# Patient Record
Sex: MALE | HISPANIC OR LATINO | Employment: FULL TIME | URBAN - METROPOLITAN AREA
[De-identification: names, ages, dates, MRNs, and addresses within clinical notes are randomized per-mention and may not be internally consistent; named-entity substitution may affect disease eponyms.]

---

## 2020-02-09 ENCOUNTER — HOSPITAL ENCOUNTER (INPATIENT)
Facility: HOSPITAL | Age: 40
LOS: 1 days | Discharge: HOME | End: 2020-02-11
Attending: EMERGENCY MEDICINE | Admitting: INTERNAL MEDICINE

## 2020-02-09 DIAGNOSIS — K52.9 ACUTE COLITIS: Primary | ICD-10-CM

## 2020-02-09 LAB
ALBUMIN SERPL-MCNC: 4.1 G/DL (ref 3.5–5.2)
ALBUMIN/GLOB SERPL: 1.2 G/DL
ALP SERPL-CCNC: 62 U/L (ref 39–117)
ALT SERPL W P-5'-P-CCNC: 39 U/L (ref 1–41)
ANION GAP SERPL CALCULATED.3IONS-SCNC: 11.5 MMOL/L (ref 5–15)
AST SERPL-CCNC: 19 U/L (ref 1–40)
BASOPHILS # BLD AUTO: 0.03 10*3/MM3 (ref 0–0.2)
BASOPHILS NFR BLD AUTO: 0.3 % (ref 0–1.5)
BILIRUB SERPL-MCNC: 0.7 MG/DL (ref 0.2–1.2)
BILIRUB UR QL STRIP: NEGATIVE
BUN BLD-MCNC: 19 MG/DL (ref 6–20)
BUN/CREAT SERPL: 18.3 (ref 7–25)
CALCIUM SPEC-SCNC: 9.2 MG/DL (ref 8.6–10.5)
CHLORIDE SERPL-SCNC: 99 MMOL/L (ref 98–107)
CLARITY UR: CLEAR
CO2 SERPL-SCNC: 27.5 MMOL/L (ref 22–29)
COLOR UR: YELLOW
CREAT BLD-MCNC: 1.04 MG/DL (ref 0.76–1.27)
DEPRECATED RDW RBC AUTO: 41 FL (ref 37–54)
EOSINOPHIL # BLD AUTO: 0.13 10*3/MM3 (ref 0–0.4)
EOSINOPHIL NFR BLD AUTO: 1.1 % (ref 0.3–6.2)
ERYTHROCYTE [DISTWIDTH] IN BLOOD BY AUTOMATED COUNT: 11.9 % (ref 12.3–15.4)
GFR SERPL CREATININE-BSD FRML MDRD: 80 ML/MIN/1.73
GFR SERPL CREATININE-BSD FRML MDRD: 96 ML/MIN/1.73
GLOBULIN UR ELPH-MCNC: 3.4 GM/DL
GLUCOSE BLD-MCNC: 114 MG/DL (ref 65–99)
GLUCOSE UR STRIP-MCNC: NEGATIVE MG/DL
HCT VFR BLD AUTO: 43.7 % (ref 37.5–51)
HGB BLD-MCNC: 14.6 G/DL (ref 13–17.7)
HGB UR QL STRIP.AUTO: NEGATIVE
HOLD SPECIMEN: NORMAL
HOLD SPECIMEN: NORMAL
IMM GRANULOCYTES # BLD AUTO: 0.04 10*3/MM3 (ref 0–0.05)
IMM GRANULOCYTES NFR BLD AUTO: 0.3 % (ref 0–0.5)
KETONES UR QL STRIP: NEGATIVE
LEUKOCYTE ESTERASE UR QL STRIP.AUTO: NEGATIVE
LIPASE SERPL-CCNC: 29 U/L (ref 13–60)
LYMPHOCYTES # BLD AUTO: 1.82 10*3/MM3 (ref 0.7–3.1)
LYMPHOCYTES NFR BLD AUTO: 15.5 % (ref 19.6–45.3)
MCH RBC QN AUTO: 30.7 PG (ref 26.6–33)
MCHC RBC AUTO-ENTMCNC: 33.4 G/DL (ref 31.5–35.7)
MCV RBC AUTO: 92 FL (ref 79–97)
MONOCYTES # BLD AUTO: 1.13 10*3/MM3 (ref 0.1–0.9)
MONOCYTES NFR BLD AUTO: 9.6 % (ref 5–12)
NEUTROPHILS # BLD AUTO: 8.56 10*3/MM3 (ref 1.7–7)
NEUTROPHILS NFR BLD AUTO: 73.2 % (ref 42.7–76)
NITRITE UR QL STRIP: NEGATIVE
NRBC BLD AUTO-RTO: 0 /100 WBC (ref 0–0.2)
PH UR STRIP.AUTO: 5.5 [PH] (ref 5–8)
PLATELET # BLD AUTO: 214 10*3/MM3 (ref 140–450)
PMV BLD AUTO: 9.3 FL (ref 6–12)
POTASSIUM BLD-SCNC: 4.1 MMOL/L (ref 3.5–5.2)
PROT SERPL-MCNC: 7.5 G/DL (ref 6–8.5)
PROT UR QL STRIP: NEGATIVE
RBC # BLD AUTO: 4.75 10*6/MM3 (ref 4.14–5.8)
SODIUM BLD-SCNC: 138 MMOL/L (ref 136–145)
SP GR UR STRIP: 1.02 (ref 1–1.03)
UROBILINOGEN UR QL STRIP: NORMAL
WBC NRBC COR # BLD: 11.71 10*3/MM3 (ref 3.4–10.8)
WHOLE BLOOD HOLD SPECIMEN: NORMAL
WHOLE BLOOD HOLD SPECIMEN: NORMAL

## 2020-02-09 PROCEDURE — 80053 COMPREHEN METABOLIC PANEL: CPT

## 2020-02-09 PROCEDURE — 83605 ASSAY OF LACTIC ACID: CPT | Performed by: EMERGENCY MEDICINE

## 2020-02-09 PROCEDURE — 83690 ASSAY OF LIPASE: CPT

## 2020-02-09 PROCEDURE — 81003 URINALYSIS AUTO W/O SCOPE: CPT

## 2020-02-09 PROCEDURE — 85025 COMPLETE CBC W/AUTO DIFF WBC: CPT

## 2020-02-09 PROCEDURE — 84145 PROCALCITONIN (PCT): CPT | Performed by: EMERGENCY MEDICINE

## 2020-02-09 PROCEDURE — 87040 BLOOD CULTURE FOR BACTERIA: CPT | Performed by: EMERGENCY MEDICINE

## 2020-02-09 PROCEDURE — 99284 EMERGENCY DEPT VISIT MOD MDM: CPT

## 2020-02-09 RX ORDER — SODIUM CHLORIDE 0.9 % (FLUSH) 0.9 %
10 SYRINGE (ML) INJECTION AS NEEDED
Status: DISCONTINUED | OUTPATIENT
Start: 2020-02-09 | End: 2020-02-11 | Stop reason: HOSPADM

## 2020-02-09 RX ORDER — ACETAMINOPHEN 500 MG
1000 TABLET ORAL ONCE
Status: COMPLETED | OUTPATIENT
Start: 2020-02-09 | End: 2020-02-09

## 2020-02-09 RX ADMIN — ACETAMINOPHEN 1000 MG: 500 TABLET, FILM COATED ORAL at 22:43

## 2020-02-10 ENCOUNTER — APPOINTMENT (OUTPATIENT)
Dept: CARDIOLOGY | Facility: HOSPITAL | Age: 40
End: 2020-02-10

## 2020-02-10 ENCOUNTER — APPOINTMENT (OUTPATIENT)
Dept: CT IMAGING | Facility: HOSPITAL | Age: 40
End: 2020-02-10

## 2020-02-10 PROBLEM — K52.9 ACUTE COLITIS: Status: ACTIVE | Noted: 2020-02-10

## 2020-02-10 PROBLEM — A41.9 SEPSIS (HCC): Status: ACTIVE | Noted: 2020-02-10

## 2020-02-10 PROBLEM — R10.32 LEFT LOWER QUADRANT ABDOMINAL PAIN: Status: ACTIVE | Noted: 2020-02-10

## 2020-02-10 LAB
ADV 40+41 DNA STL QL NAA+NON-PROBE: NOT DETECTED
ANION GAP SERPL CALCULATED.3IONS-SCNC: 11.8 MMOL/L (ref 5–15)
AORTIC DIMENSIONLESS INDEX: 0.7 (DI)
ASTRO TYP 1-8 RNA STL QL NAA+NON-PROBE: NOT DETECTED
BH CV ECHO MEAS - ACS: 2.2 CM
BH CV ECHO MEAS - AO MAX PG (FULL): 3.8 MMHG
BH CV ECHO MEAS - AO MAX PG: 7 MMHG
BH CV ECHO MEAS - AO MEAN PG (FULL): 3 MMHG
BH CV ECHO MEAS - AO MEAN PG: 5 MMHG
BH CV ECHO MEAS - AO ROOT AREA (BSA CORRECTED): 1.2
BH CV ECHO MEAS - AO ROOT AREA: 6.6 CM^2
BH CV ECHO MEAS - AO ROOT DIAM: 2.9 CM
BH CV ECHO MEAS - AO V2 MAX: 132 CM/SEC
BH CV ECHO MEAS - AO V2 MEAN: 104 CM/SEC
BH CV ECHO MEAS - AO V2 VTI: 26.7 CM
BH CV ECHO MEAS - ASC AORTA: 2.6 CM
BH CV ECHO MEAS - AVA(I,A): 2.6 CM^2
BH CV ECHO MEAS - AVA(I,D): 2.6 CM^2
BH CV ECHO MEAS - AVA(V,A): 2.6 CM^2
BH CV ECHO MEAS - AVA(V,D): 2.6 CM^2
BH CV ECHO MEAS - BSA(HAYCOCK): 2.5 M^2
BH CV ECHO MEAS - BSA: 2.4 M^2
BH CV ECHO MEAS - BZI_BMI: 37 KILOGRAMS/M^2
BH CV ECHO MEAS - BZI_METRIC_HEIGHT: 180 CM
BH CV ECHO MEAS - BZI_METRIC_WEIGHT: 120 KG
BH CV ECHO MEAS - EDV(CUBED): 132.7 ML
BH CV ECHO MEAS - EDV(MOD-SP2): 75 ML
BH CV ECHO MEAS - EDV(MOD-SP4): 143 ML
BH CV ECHO MEAS - EDV(TEICH): 123.8 ML
BH CV ECHO MEAS - EF(CUBED): 77.5 %
BH CV ECHO MEAS - EF(MOD-BP): 61 %
BH CV ECHO MEAS - EF(MOD-SP2): 52 %
BH CV ECHO MEAS - EF(MOD-SP4): 66.4 %
BH CV ECHO MEAS - EF(TEICH): 69.4 %
BH CV ECHO MEAS - ESV(CUBED): 29.8 ML
BH CV ECHO MEAS - ESV(MOD-SP2): 36 ML
BH CV ECHO MEAS - ESV(MOD-SP4): 48 ML
BH CV ECHO MEAS - ESV(TEICH): 37.9 ML
BH CV ECHO MEAS - FS: 39.2 %
BH CV ECHO MEAS - IVS/LVPW: 1
BH CV ECHO MEAS - IVSD: 1.3 CM
BH CV ECHO MEAS - LAT PEAK E' VEL: 9.3 CM/SEC
BH CV ECHO MEAS - LV DIASTOLIC VOL/BSA (35-75): 60.3 ML/M^2
BH CV ECHO MEAS - LV MASS(C)D: 270.1 GRAMS
BH CV ECHO MEAS - LV MASS(C)DI: 113.9 GRAMS/M^2
BH CV ECHO MEAS - LV MAX PG: 3.2 MMHG
BH CV ECHO MEAS - LV MEAN PG: 2 MMHG
BH CV ECHO MEAS - LV SYSTOLIC VOL/BSA (12-30): 20.2 ML/M^2
BH CV ECHO MEAS - LV V1 MAX: 89.4 CM/SEC
BH CV ECHO MEAS - LV V1 MEAN: 63 CM/SEC
BH CV ECHO MEAS - LV V1 VTI: 18.4 CM
BH CV ECHO MEAS - LVIDD: 5.1 CM
BH CV ECHO MEAS - LVIDS: 3.1 CM
BH CV ECHO MEAS - LVLD AP2: 7.8 CM
BH CV ECHO MEAS - LVLD AP4: 8.5 CM
BH CV ECHO MEAS - LVLS AP2: 7.6 CM
BH CV ECHO MEAS - LVLS AP4: 7.8 CM
BH CV ECHO MEAS - LVOT AREA (M): 3.8 CM^2
BH CV ECHO MEAS - LVOT AREA: 3.8 CM^2
BH CV ECHO MEAS - LVOT DIAM: 2.2 CM
BH CV ECHO MEAS - LVPWD: 1.3 CM
BH CV ECHO MEAS - MED PEAK E' VEL: 7.62 CM/SEC
BH CV ECHO MEAS - MV A DUR: 183 SEC
BH CV ECHO MEAS - MV A MAX VEL: 62.6 CM/SEC
BH CV ECHO MEAS - MV DEC SLOPE: 479 CM/SEC^2
BH CV ECHO MEAS - MV DEC TIME: 222 SEC
BH CV ECHO MEAS - MV E MAX VEL: 72 CM/SEC
BH CV ECHO MEAS - MV E/A: 1.2
BH CV ECHO MEAS - MV MAX PG: 3.9 MMHG
BH CV ECHO MEAS - MV MEAN PG: 1 MMHG
BH CV ECHO MEAS - MV P1/2T MAX VEL: 106 CM/SEC
BH CV ECHO MEAS - MV P1/2T: 64.8 MSEC
BH CV ECHO MEAS - MV V2 MAX: 99.2 CM/SEC
BH CV ECHO MEAS - MV V2 MEAN: 51 CM/SEC
BH CV ECHO MEAS - MV V2 VTI: 28.3 CM
BH CV ECHO MEAS - MVA P1/2T LCG: 2.1 CM^2
BH CV ECHO MEAS - MVA(P1/2T): 3.4 CM^2
BH CV ECHO MEAS - MVA(VTI): 2.5 CM^2
BH CV ECHO MEAS - PA ACC TIME: 0.12 SEC
BH CV ECHO MEAS - PA MAX PG (FULL): 4.9 MMHG
BH CV ECHO MEAS - PA MAX PG: 6.9 MMHG
BH CV ECHO MEAS - PA PR(ACCEL): 23.7 MMHG
BH CV ECHO MEAS - PA V2 MAX: 131 CM/SEC
BH CV ECHO MEAS - PULM A REVS DUR: 0.1 SEC
BH CV ECHO MEAS - PULM A REVS VEL: 21 CM/SEC
BH CV ECHO MEAS - PULM DIAS VEL: 50.1 CM/SEC
BH CV ECHO MEAS - PULM S/D: 0.91
BH CV ECHO MEAS - PULM SYS VEL: 45.4 CM/SEC
BH CV ECHO MEAS - PVA(V,A): 3.1 CM^2
BH CV ECHO MEAS - PVA(V,D): 3.1 CM^2
BH CV ECHO MEAS - QP/QS: 1.4
BH CV ECHO MEAS - RAP SYSTOLE: 29 MMHG
BH CV ECHO MEAS - RV MAX PG: 2 MMHG
BH CV ECHO MEAS - RV MEAN PG: 1 MMHG
BH CV ECHO MEAS - RV V1 MAX: 70 CM/SEC
BH CV ECHO MEAS - RV V1 MEAN: 46.5 CM/SEC
BH CV ECHO MEAS - RV V1 VTI: 16.5 CM
BH CV ECHO MEAS - RVOT AREA: 5.7 CM^2
BH CV ECHO MEAS - RVOT DIAM: 2.7 CM
BH CV ECHO MEAS - RVSP: 8 MMHG
BH CV ECHO MEAS - SI(AO): 74.4 ML/M^2
BH CV ECHO MEAS - SI(CUBED): 43.4 ML/M^2
BH CV ECHO MEAS - SI(LVOT): 29.5 ML/M^2
BH CV ECHO MEAS - SI(MOD-SP2): 16.4 ML/M^2
BH CV ECHO MEAS - SI(MOD-SP4): 40.1 ML/M^2
BH CV ECHO MEAS - SI(TEICH): 36.2 ML/M^2
BH CV ECHO MEAS - SV(AO): 176.4 ML
BH CV ECHO MEAS - SV(CUBED): 102.9 ML
BH CV ECHO MEAS - SV(LVOT): 69.9 ML
BH CV ECHO MEAS - SV(MOD-SP2): 39 ML
BH CV ECHO MEAS - SV(MOD-SP4): 95 ML
BH CV ECHO MEAS - SV(RVOT): 94.5 ML
BH CV ECHO MEAS - SV(TEICH): 85.9 ML
BH CV ECHO MEAS - TAPSE (>1.6): 2.6 CM2
BH CV ECHO MEAS - TR MAX PG: 21 MMHG
BH CV ECHO MEAS - TR MAX VEL: 227 CM/SEC
BH CV ECHO MEASUREMENTS AVERAGE E/E' RATIO: 8.51
BH CV XLRA - RV BASE: 3.56 CM
BH CV XLRA - TDI S': 15.8 CM/SEC
BUN BLD-MCNC: 19 MG/DL (ref 6–20)
BUN/CREAT SERPL: 19.6 (ref 7–25)
C CAYETANENSIS DNA STL QL NAA+NON-PROBE: NOT DETECTED
C DIFF TOX GENS STL QL NAA+PROBE: NEGATIVE
CALCIUM SPEC-SCNC: 9.1 MG/DL (ref 8.6–10.5)
CAMPY SP DNA.DIARRHEA STL QL NAA+PROBE: NOT DETECTED
CHLORIDE SERPL-SCNC: 102 MMOL/L (ref 98–107)
CO2 SERPL-SCNC: 25.2 MMOL/L (ref 22–29)
CREAT BLD-MCNC: 0.97 MG/DL (ref 0.76–1.27)
CRYPTOSP STL CULT: NOT DETECTED
D-LACTATE SERPL-SCNC: 1.1 MMOL/L (ref 0.5–2)
DEPRECATED RDW RBC AUTO: 39.9 FL (ref 37–54)
E COLI DNA SPEC QL NAA+PROBE: NOT DETECTED
E HISTOLYT AG STL-ACNC: NOT DETECTED
EAEC PAA PLAS AGGR+AATA ST NAA+NON-PRB: NOT DETECTED
EC STX1 + STX2 GENES STL NAA+PROBE: NOT DETECTED
EPEC EAE GENE STL QL NAA+NON-PROBE: NOT DETECTED
ERYTHROCYTE [DISTWIDTH] IN BLOOD BY AUTOMATED COUNT: 11.9 % (ref 12.3–15.4)
ETEC LTA+ST1A+ST1B TOX ST NAA+NON-PROBE: NOT DETECTED
FLUAV AG NPH QL: NEGATIVE
FLUBV AG NPH QL IA: NEGATIVE
G LAMBLIA DNA SPEC QL NAA+PROBE: NOT DETECTED
GFR SERPL CREATININE-BSD FRML MDRD: 104 ML/MIN/1.73
GFR SERPL CREATININE-BSD FRML MDRD: 86 ML/MIN/1.73
GLUCOSE BLD-MCNC: 113 MG/DL (ref 65–99)
HCT VFR BLD AUTO: 39.3 % (ref 37.5–51)
HGB BLD-MCNC: 12.9 G/DL (ref 13–17.7)
LV EF 2D ECHO EST: 61 %
MAXIMAL PREDICTED HEART RATE: 181 BPM
MCH RBC QN AUTO: 30.2 PG (ref 26.6–33)
MCHC RBC AUTO-ENTMCNC: 32.8 G/DL (ref 31.5–35.7)
MCV RBC AUTO: 92 FL (ref 79–97)
NOROVIRUS GI+II RNA STL QL NAA+NON-PROBE: NOT DETECTED
P SHIGELLOIDES DNA STL QL NAA+PROBE: NOT DETECTED
PLATELET # BLD AUTO: 190 10*3/MM3 (ref 140–450)
PMV BLD AUTO: 9.4 FL (ref 6–12)
POTASSIUM BLD-SCNC: 4.1 MMOL/L (ref 3.5–5.2)
PROCALCITONIN SERPL-MCNC: 0.1 NG/ML (ref 0.1–0.25)
RBC # BLD AUTO: 4.27 10*6/MM3 (ref 4.14–5.8)
RV RNA STL NAA+PROBE: NOT DETECTED
SALMONELLA DNA SPEC QL NAA+PROBE: NOT DETECTED
SAPO I+II+IV+V RNA STL QL NAA+NON-PROBE: NOT DETECTED
SHIGELLA SP+EIEC IPAH STL QL NAA+PROBE: NOT DETECTED
SODIUM BLD-SCNC: 139 MMOL/L (ref 136–145)
STRESS TARGET HR: 154 BPM
V CHOLERAE DNA SPEC QL NAA+PROBE: NOT DETECTED
VIBRIO DNA SPEC NAA+PROBE: NOT DETECTED
WBC NRBC COR # BLD: 12.17 10*3/MM3 (ref 3.4–10.8)
YERSINIA STL CULT: NOT DETECTED

## 2020-02-10 PROCEDURE — 87804 INFLUENZA ASSAY W/OPTIC: CPT | Performed by: EMERGENCY MEDICINE

## 2020-02-10 PROCEDURE — 87040 BLOOD CULTURE FOR BACTERIA: CPT | Performed by: EMERGENCY MEDICINE

## 2020-02-10 PROCEDURE — 25010000002 PERFLUTREN (DEFINITY) 8.476 MG IN SODIUM CHLORIDE 0.9 % 10 ML INJECTION: Performed by: INTERNAL MEDICINE

## 2020-02-10 PROCEDURE — 80048 BASIC METABOLIC PNL TOTAL CA: CPT | Performed by: NURSE PRACTITIONER

## 2020-02-10 PROCEDURE — 25010000002 MORPHINE PER 10 MG: Performed by: NURSE PRACTITIONER

## 2020-02-10 PROCEDURE — 93306 TTE W/DOPPLER COMPLETE: CPT

## 2020-02-10 PROCEDURE — 0097U HC BIOFIRE FILMARRAY GI PANEL: CPT | Performed by: INTERNAL MEDICINE

## 2020-02-10 PROCEDURE — 99254 IP/OBS CNSLTJ NEW/EST MOD 60: CPT | Performed by: INTERNAL MEDICINE

## 2020-02-10 PROCEDURE — 25010000002 PIPERACILLIN SOD-TAZOBACTAM PER 1 G: Performed by: EMERGENCY MEDICINE

## 2020-02-10 PROCEDURE — 87493 C DIFF AMPLIFIED PROBE: CPT | Performed by: INTERNAL MEDICINE

## 2020-02-10 PROCEDURE — 25010000002 IOPAMIDOL 61 % SOLUTION: Performed by: EMERGENCY MEDICINE

## 2020-02-10 PROCEDURE — 85027 COMPLETE CBC AUTOMATED: CPT | Performed by: NURSE PRACTITIONER

## 2020-02-10 PROCEDURE — 93306 TTE W/DOPPLER COMPLETE: CPT | Performed by: INTERNAL MEDICINE

## 2020-02-10 PROCEDURE — 25010000002 PIPERACILLIN SOD-TAZOBACTAM PER 1 G: Performed by: NURSE PRACTITIONER

## 2020-02-10 PROCEDURE — 74177 CT ABD & PELVIS W/CONTRAST: CPT

## 2020-02-10 RX ORDER — ACETAMINOPHEN 650 MG/1
650 SUPPOSITORY RECTAL EVERY 4 HOURS PRN
Status: DISCONTINUED | OUTPATIENT
Start: 2020-02-10 | End: 2020-02-11 | Stop reason: HOSPADM

## 2020-02-10 RX ORDER — SODIUM CHLORIDE 0.9 % (FLUSH) 0.9 %
10 SYRINGE (ML) INJECTION AS NEEDED
Status: DISCONTINUED | OUTPATIENT
Start: 2020-02-10 | End: 2020-02-11 | Stop reason: HOSPADM

## 2020-02-10 RX ORDER — SODIUM CHLORIDE 9 MG/ML
100 INJECTION, SOLUTION INTRAVENOUS CONTINUOUS
Status: DISCONTINUED | OUTPATIENT
Start: 2020-02-10 | End: 2020-02-11 | Stop reason: HOSPADM

## 2020-02-10 RX ORDER — ACETAMINOPHEN 325 MG/1
650 TABLET ORAL EVERY 4 HOURS PRN
Status: DISCONTINUED | OUTPATIENT
Start: 2020-02-10 | End: 2020-02-11 | Stop reason: HOSPADM

## 2020-02-10 RX ORDER — ONDANSETRON 2 MG/ML
4 INJECTION INTRAMUSCULAR; INTRAVENOUS EVERY 6 HOURS PRN
Status: DISCONTINUED | OUTPATIENT
Start: 2020-02-10 | End: 2020-02-11 | Stop reason: HOSPADM

## 2020-02-10 RX ORDER — ACETAMINOPHEN 160 MG/5ML
650 SOLUTION ORAL EVERY 4 HOURS PRN
Status: DISCONTINUED | OUTPATIENT
Start: 2020-02-10 | End: 2020-02-11 | Stop reason: HOSPADM

## 2020-02-10 RX ORDER — MORPHINE SULFATE 2 MG/ML
2 INJECTION, SOLUTION INTRAMUSCULAR; INTRAVENOUS
Status: DISCONTINUED | OUTPATIENT
Start: 2020-02-10 | End: 2020-02-11 | Stop reason: HOSPADM

## 2020-02-10 RX ORDER — SODIUM CHLORIDE 0.9 % (FLUSH) 0.9 %
10 SYRINGE (ML) INJECTION EVERY 12 HOURS SCHEDULED
Status: DISCONTINUED | OUTPATIENT
Start: 2020-02-10 | End: 2020-02-11 | Stop reason: HOSPADM

## 2020-02-10 RX ADMIN — MORPHINE SULFATE 2 MG: 2 INJECTION, SOLUTION INTRAMUSCULAR; INTRAVENOUS at 02:59

## 2020-02-10 RX ADMIN — IOPAMIDOL 85 ML: 612 INJECTION, SOLUTION INTRAVENOUS at 00:36

## 2020-02-10 RX ADMIN — TAZOBACTAM SODIUM AND PIPERACILLIN SODIUM 3.38 G: 375; 3 INJECTION, SOLUTION INTRAVENOUS at 08:01

## 2020-02-10 RX ADMIN — TAZOBACTAM SODIUM AND PIPERACILLIN SODIUM 3.38 G: 375; 3 INJECTION, SOLUTION INTRAVENOUS at 01:47

## 2020-02-10 RX ADMIN — MORPHINE SULFATE 2 MG: 2 INJECTION, SOLUTION INTRAMUSCULAR; INTRAVENOUS at 14:47

## 2020-02-10 RX ADMIN — TAZOBACTAM SODIUM AND PIPERACILLIN SODIUM 3.38 G: 375; 3 INJECTION, SOLUTION INTRAVENOUS at 17:16

## 2020-02-10 RX ADMIN — SODIUM CHLORIDE 100 ML/HR: 9 INJECTION, SOLUTION INTRAVENOUS at 02:59

## 2020-02-10 RX ADMIN — PERFLUTREN 2 ML: 6.52 INJECTION, SUSPENSION INTRAVENOUS at 12:10

## 2020-02-10 RX ADMIN — MORPHINE SULFATE 2 MG: 2 INJECTION, SOLUTION INTRAMUSCULAR; INTRAVENOUS at 06:28

## 2020-02-10 RX ADMIN — ACETAMINOPHEN 650 MG: 325 TABLET, FILM COATED ORAL at 21:30

## 2020-02-10 RX ADMIN — SODIUM CHLORIDE 100 ML/HR: 9 INJECTION, SOLUTION INTRAVENOUS at 16:58

## 2020-02-10 NOTE — CONSULTS
[facility]  Initial Inpatient Consult Note    Referring Provider: Dr. Cantu    Reason for Consultation: colitis (?)    Subjective     History of present illness:    39 y.o. male who is from [place] and only speaks Thai who was admitted 2/9/2020 with 1 week history of lower abdominal pain that is worse over the weekend and he had fever over the weekend.  Patient has had loose nonbloody bowels for the previous 1 day.  He was having a bowel movement every 2 hours yesterday.  His last bowel movement was about an hour ago.  He has had no rectal bleeding or melena.  He has not had anything like this in the past.  His last antibiotics prior to admission were last week.  His last foreign travel was in 2011 to [place].  Normally he has no diarrhea and no constipation.  Normally he has no abdominal pain.  He has no nausea or vomiting.  His weight is stable.  He is a non-smoker and denies alcohol use.  He does speak only Thai.  He works for a [employer].  He has 3 children.  I did discuss this with his sister who was the .  He had a CT of the abdomen and pelvis with IV contrast earlier today that showed:  IMPRESSION :   1. 4.7 cm suspected pericardial cyst, suggest echocardiography.  2. Minimal diverticulosis with short segment eccentric sigmoid colitis  as discussed. Recommend endoscopic follow-up     The patient has never had a colonoscopy.    Past Medical History:  History reviewed. No pertinent past medical history.  Past Surgical History:  History reviewed. No pertinent surgical history.   Social History:   Social History     Tobacco Use   • Smoking status: Never Smoker   Substance Use Topics   • Alcohol use: Never     Frequency: Never      Family History:  History reviewed. No pertinent family history.    Home Meds:  No medications prior to admission.     Current Meds:     [START ON 2/11/2020] influenza vaccine 0.5 mL Intramuscular Once   piperacillin-tazobactam 3.375 g  Intravenous Q8H   sodium chloride 10 mL Intravenous Q12H     Allergies:  No Known Allergies  Review of Systems  The following systems were reviewed and negative;  constitution, ENT, respiratory, cardiovascular, genitourinary, hematologic / lymphatic, musculoskeletal and neurological     Objective     Vital Signs  Temp:  [97.7 °F (36.5 °C)-102.5 °F (39.2 °C)] 99 °F (37.2 °C)  Heart Rate:  [69-98] 69  Resp:  [16-18] 18  BP: (135-149)/(67-78) 139/76  Physical Exam:  General Appearance:    Alert, cooperative, in no acute distress   Head:    Normocephalic, without obvious abnormality, atraumatic   Eyes:          conjunctivae and sclerae normal, no   icterus   Throat:   no thrush, oral mucosa moist   Neck:   Supple, no adenopathy   Lungs:     Clear to auscultation bilaterally    Heart:    Regular rhythm and normal rate    Chest Wall:    No abnormalities observed   Abdomen:     Soft, nondistended, mild LLQ abdominal tenderness; normal bowel sounds   Extremities:   no edema, no redness   Skin:   No bruising or rash   Psychiatric:  normal mood and insight     Results Review:   I reviewed the patient's new clinical results.    Results from last 7 days   Lab Units 02/10/20  0657 02/09/20  2158   WBC 10*3/mm3 12.17* 11.71*   HEMOGLOBIN g/dL 12.9* 14.6   HEMATOCRIT % 39.3 43.7   PLATELETS 10*3/mm3 190 214     Results from last 7 days   Lab Units 02/10/20  0658 02/09/20  2158   SODIUM mmol/L 139 138   POTASSIUM mmol/L 4.1 4.1   CHLORIDE mmol/L 102 99   CO2 mmol/L 25.2 27.5   BUN mg/dL 19 19   CREATININE mg/dL 0.97 1.04   CALCIUM mg/dL 9.1 9.2   BILIRUBIN mg/dL  --  0.7   ALK PHOS U/L  --  62   ALT (SGPT) U/L  --  39   AST (SGOT) U/L  --  19   GLUCOSE mg/dL 113* 114*         Lab Results   Lab Value Date/Time    LIPASE 29 02/09/2020 2158       Radiology:  CT Abdomen Pelvis With Contrast   Preliminary Result   IMPRESSION :    1. 4.7 cm suspected pericardial cyst, suggest echocardiography.   2. Minimal diverticulosis with short  segment eccentric sigmoid colitis   as discussed. Recommend endoscopic follow-up                          Assessment/Plan   Patient Active Problem List   Diagnosis   • Acute colitis       Assessment:    1.  This 39-year-old Dominican American male who only speaks Latvian and was admitted with left lower quadrant abdominal pain diarrhea and fever.  CAT scan of the abdomen is consistent with colitis versus diverticulitis?  Patient claims she is never had anything like this in the past.    Recommendations:  1.  I agree with IV antibiotics.  2.  I will order stool studies.  Will order a stool for C. difficile toxin and stool PCR.  3.  I would recommend that he have a colonoscopy once this resolves, in 6 to 8 weeks as an outpatient.     I discussed the patients findings and my recommendations with patient and family.    Osvaldo Mccullough MD

## 2020-02-10 NOTE — PLAN OF CARE
Problem: Patient Care Overview  Goal: Plan of Care Review  Outcome: Ongoing (interventions implemented as appropriate)  Flowsheets (Taken 2/10/2020 1723)  Progress: no change  Plan of Care Reviewed With: patient; sibling; family  Outcome Summary: pt has been resting between care, medicated x1 with morphne 2mg for c/o pain with good results. family is at bedside, [place] interperter phone at bedside. stool specimen sent to lab, pt in isolation awaiting results. IV abx and IV fluids continue,  will monitor and provide care.

## 2020-02-10 NOTE — PROGRESS NOTES
"Pharmacy Consult - Zosyn Dosing     Pt Name: Navdeep Mcelroy   MRN: [id number]  : 1980  Weight: 120 kg (264 lb)  BMI: Body mass index is 36.82 kg/m².    Consult for pharmacy to dose Zosyn   Indication:  Intra-Abdominal Infection.  Consulted by:  KLEVER Pope      Relevant clinical data and objective history reviewed:  39 y.o. male 180.3 cm (71\") 120 kg (264 lb)    History reviewed. No pertinent past medical history.  Creatinine   Date Value Ref Range Status   2020 1.04 0.76 - 1.27 mg/dL Final     BUN   Date Value Ref Range Status   2020 19 6 - 20 mg/dL Final     Estimated Creatinine Clearance: 125.7 mL/min (by C-G formula based on SCr of 1.04 mg/dL).    Lab Results   Component Value Date    WBC 11.71 (H) 2020     Temp Readings from Last 3 Encounters:   02/10/20 99.8 °F (37.7 °C) (Oral)          Assessment/Plan  Will start Zosyn 3.375 g IV every 8 hours.. Pharmacy will continue to follow daily while on Zosyn and adjust as needed.     Pharmacy will discontinue the Pharmacy to Dose consult at this time. Renal function will continue to be monitored and dosing adjustments will be made by pharmacy based on renal function if necessary    Cullen Rendon RPH        "

## 2020-02-10 NOTE — ED NOTES
"Nursing report ED to floor  Navdeep Mcelroy  39 y.o.  male    HPI (triage note):   Chief Complaint   Patient presents with   • Abdominal Pain       Admitting doctor:   Saad Flores MD    Admitting diagnosis:   The encounter diagnosis was Acute colitis.    Code status:   Current Code Status     Date Active Code Status Order ID Comments User Context       Not on file          Allergies:   Patient has no known allergies.    Weight:       02/10/20  0149   Weight: 120 kg (264 lb)       Most recent vitals:   Vitals:    02/09/20 2327 02/10/20 0148 02/10/20 0149 02/10/20 0153   BP:  136/78     BP Location:  Right arm     Patient Position:       Pulse:  84     Resp:  16     Temp: (!) 102.5 °F (39.2 °C) 97.7 °F (36.5 °C)     TempSrc: Tympanic Tympanic     SpO2:  96%     Weight:   120 kg (264 lb)    Height:    180.3 cm (71\")       Active LDAs/IV Access:   Lines, Drains & Airways    Active LDAs     Name:   Placement date:   Placement time:   Site:   Days:    Peripheral IV 02/10/20 0019 Right Hand   02/10/20    0019    Hand   less than 1                Labs (abnormal labs have a star):   Labs Reviewed   COMPREHENSIVE METABOLIC PANEL - Abnormal; Notable for the following components:       Result Value    Glucose 114 (*)     All other components within normal limits    Narrative:     GFR Normal >60  Chronic Kidney Disease <60  Kidney Failure <15     CBC WITH AUTO DIFFERENTIAL - Abnormal; Notable for the following components:    WBC 11.71 (*)     RDW 11.9 (*)     Lymphocyte % 15.5 (*)     Neutrophils, Absolute 8.56 (*)     Monocytes, Absolute 1.13 (*)     All other components within normal limits   INFLUENZA ANTIGEN, RAPID - Normal   LIPASE - Normal   URINALYSIS W/ MICROSCOPIC IF INDICATED (NO CULTURE) - Normal    Narrative:     Urine microscopic not indicated.   LACTIC ACID, PLASMA - Normal   BLOOD CULTURE   BLOOD CULTURE   RAINBOW DRAW    Narrative:     The following orders were created for panel order [facility] " Draw.  Procedure                               Abnormality         Status                     ---------                               -----------         ------                     Light Blue Top[[id number]]                                   Final result               Green Top (Gel)[[id number]]                                  Final result               Lavender Top[[id number]]                                     Final result               Gold Top - SST[[id number]]                                   Final result                 Please view results for these tests on the individual orders.   PROCALCITONIN   CBC AND DIFFERENTIAL    Narrative:     The following orders were created for panel order CBC & Differential.  Procedure                               Abnormality         Status                     ---------                               -----------         ------                     CBC Auto Differential[[id number]]        Abnormal            Final result                 Please view results for these tests on the individual orders.   LIGHT BLUE TOP   GREEN TOP   LAVENDER TOP   GOLD TOP - SST       EKG:   No orders to display       Meds given in ED:   Medications   sodium chloride 0.9 % flush 10 mL (has no administration in time range)   acetaminophen (TYLENOL) tablet 1,000 mg (1,000 mg Oral Given 2/9/20 8811)   iopamidol (ISOVUE-300) 61 % injection 85 mL (85 mL Intravenous Given by Other 2/10/20 0036)   piperacillin-tazobactam (ZOSYN) 3.375 g in iso-osmotic dextrose 50 ml (premix) (3.375 g Intravenous New Bag 2/10/20 0147)       Imaging results:  Ct Abdomen Pelvis With Contrast    Result Date: 2/10/2020  IMPRESSION : 1. 4.7 cm suspected pericardial cyst, suggest echocardiography. 2. Minimal diverticulosis with short segment eccentric sigmoid colitis as discussed. Recommend endoscopic follow-up           Ambulatory status:   Self     Social issues:   Social History     Socioeconomic History   • Marital status:       Spouse name: Not on file   • Number of children: Not on file   • Years of education: Not on file   • Highest education level: Not on file   Tobacco Use   • Smoking status: Never Smoker   Substance and Sexual Activity   • Alcohol use: Never     Frequency: Never   • Drug use: Never   • Sexual activity: Zita Edge RN  02/10/20 0158

## 2020-02-10 NOTE — PLAN OF CARE
Problem: Patient Care Overview  Goal: Plan of Care Review  Outcome: Ongoing (interventions implemented as appropriate)  Flowsheets (Taken 2/10/2020 7161)  Progress: no change  Plan of Care Reviewed With: patient; spouse; sibling  Outcome Summary: Patient was admitted from ER. Morphine 2 mg IV was given x2 for pain. Family at bedside. Will need . Loose stools noted. Will continue to monitor vital signs, labs and comfort.

## 2020-02-10 NOTE — H&P
Patient Name:  Navdeep Mcelroy  YOB: 1980  MRN:  [id number]  Admit Date:  2/9/2020  Patient Care Team:  Provider, No Known as PCP - General      Chief Complaint   Patient presents with   • Abdominal Pain       Subjective     Mr. Mcelroy is a 39 y.o. male with no medical history that presents to [facility] complaining of abdominal pain. Patient is Ugandan speaking and family at bedside provides translation. He reports constant lower right sided, cramping abdominal pain that began Monday and worsened throughout yesterday and today, no aggravating or alleviating factors. He also reports fever at home but denies chest pain, shortness of breath, changes in bowel or bladder habits, nausea or vomiting, edema. CT abdomen done tonight shows sigmoid colitis. He did have a temperature of 102.5 while in ED and WBC was 11, blood cultures were drawn and he was given zosyn at that time.     History of Present Illness    History reviewed. No pertinent past medical history.  History reviewed. No pertinent surgical history.  History reviewed. No pertinent family history.  Social History     Tobacco Use   • Smoking status: Never Smoker   Substance Use Topics   • Alcohol use: Never     Frequency: Never   • Drug use: Never       (Not in a hospital admission)  Allergies:  No Known Allergies    Review of Systems   Constitutional: Negative.  Negative for chills.   HENT: Negative for congestion and sore throat.    Eyes: Negative.  Negative for visual disturbance.   Respiratory: Negative.  Negative for shortness of breath.    Cardiovascular: Negative.  Negative for chest pain.   Gastrointestinal: Positive for abdominal pain. Negative for diarrhea, nausea and vomiting.   Endocrine: Negative.    Genitourinary: Negative.  Negative for dysuria, frequency and urgency.   Musculoskeletal: Negative.  Negative for arthralgias and myalgias.   Skin: Negative.  Negative for color change, pallor and rash.    Allergic/Immunologic: Negative.    Neurological: Positive for headaches. Negative for dizziness, weakness and light-headedness.   Hematological: Negative.    Psychiatric/Behavioral: Negative.  Negative for agitation, behavioral problems, confusion and decreased concentration.        Objective    Vital Signs  Temp:  [97.7 °F (36.5 °C)-102.5 °F (39.2 °C)] 97.7 °F (36.5 °C)  Heart Rate:  [84-98] 84  Resp:  [16] 16  BP: (136-149)/(67-78) 136/78  SpO2:  [96 %-98 %] 96 %  on   ;   Device (Oxygen Therapy): room air  There is no height or weight on file to calculate BMI.    Physical Exam   Constitutional: He is oriented to person, place, and time. He appears well-developed and well-nourished. No distress.   HENT:   Head: Normocephalic and atraumatic.   Eyes: EOM are normal.   Neck: Normal range of motion. Neck supple.   Cardiovascular: Normal rate, regular rhythm and intact distal pulses.   No murmur heard.  Pulmonary/Chest: Effort normal and breath sounds normal. No respiratory distress.   Abdominal: Soft. Bowel sounds are normal. He exhibits no distension. There is tenderness in the right lower quadrant. There is no rigidity, no rebound and no guarding.   Musculoskeletal: Normal range of motion.   Neurological: He is alert and oriented to person, place, and time.   Skin: Skin is warm and dry. He is not diaphoretic. No erythema.   Psychiatric: He has a normal mood and affect. His behavior is normal. Judgment and thought content normal.   Nursing note and vitals reviewed.      Results Review:   I reviewed the patient's new clinical results including all labs and xrays.    Lab Results (last 24 hours)     Procedure Component Value Units Date/Time    CBC & Differential [[id number]] Collected:  02/09/20 2158    Specimen:  Blood Updated:  02/09/20 2228    Narrative:       The following orders were created for panel order CBC & Differential.  Procedure                               Abnormality         Status                      ---------                               -----------         ------                     CBC Auto Differential[[id number]]        Abnormal            Final result                 Please view results for these tests on the individual orders.    Comprehensive Metabolic Panel [[id number]]  (Abnormal) Collected:  02/09/20 2158    Specimen:  Blood Updated:  02/09/20 2252     Glucose 114 mg/dL      BUN 19 mg/dL      Creatinine 1.04 mg/dL      Sodium 138 mmol/L      Potassium 4.1 mmol/L      Chloride 99 mmol/L      CO2 27.5 mmol/L      Calcium 9.2 mg/dL      Total Protein 7.5 g/dL      Albumin 4.10 g/dL      ALT (SGPT) 39 U/L      AST (SGOT) 19 U/L      Alkaline Phosphatase 62 U/L      Total Bilirubin 0.7 mg/dL      eGFR Non African Amer 80 mL/min/1.73      eGFR  African Amer 96 mL/min/1.73      Globulin 3.4 gm/dL      A/G Ratio 1.2 g/dL      BUN/Creatinine Ratio 18.3     Anion Gap 11.5 mmol/L     Narrative:       GFR Normal >60  Chronic Kidney Disease <60  Kidney Failure <15      Lipase [[id number]]  (Normal) Collected:  02/09/20 2158    Specimen:  Blood Updated:  02/09/20 2252     Lipase 29 U/L     CBC Auto Differential [[id number]]  (Abnormal) Collected:  02/09/20 2158    Specimen:  Blood Updated:  02/09/20 2228     WBC 11.71 10*3/mm3      RBC 4.75 10*6/mm3      Hemoglobin 14.6 g/dL      Hematocrit 43.7 %      MCV 92.0 fL      MCH 30.7 pg      MCHC 33.4 g/dL      RDW 11.9 %      RDW-SD 41.0 fl      MPV 9.3 fL      Platelets 214 10*3/mm3      Neutrophil % 73.2 %      Lymphocyte % 15.5 %      Monocyte % 9.6 %      Eosinophil % 1.1 %      Basophil % 0.3 %      Immature Grans % 0.3 %      Neutrophils, Absolute 8.56 10*3/mm3      Lymphocytes, Absolute 1.82 10*3/mm3      Monocytes, Absolute 1.13 10*3/mm3      Eosinophils, Absolute 0.13 10*3/mm3      Basophils, Absolute 0.03 10*3/mm3      Immature Grans, Absolute 0.04 10*3/mm3      nRBC 0.0 /100 WBC     Urinalysis With Microscopic If Indicated (No Culture) - Urine, Clean Catch  [[id number]]  (Normal) Collected:  02/09/20 2244    Specimen:  Urine, Clean Catch Updated:  02/09/20 2308     Color, UA Yellow     Appearance, UA Clear     pH, UA 5.5     Specific Gravity, UA 1.024     Glucose, UA Negative     Ketones, UA Negative     Bilirubin, UA Negative     Blood, UA Negative     Protein, UA Negative     Leuk Esterase, UA Negative     Nitrite, UA Negative     Urobilinogen, UA 1.0 E.U./dL    Narrative:       Urine microscopic not indicated.    Blood Culture - Blood, Arm, Left [[id number]] Collected:  02/09/20 2358    Specimen:  Blood from Arm, Left Updated:  02/10/20 0005    Lactic Acid, Plasma [[id number]]  (Normal) Collected:  02/09/20 2358    Specimen:  Blood Updated:  02/10/20 0022     Lactate 1.1 mmol/L     Influenza Antigen, Rapid - Swab, Nasopharynx [[id number]]  (Normal) Collected:  02/10/20 0000    Specimen:  Swab from Nasopharynx Updated:  02/10/20 0024     Influenza A Ag, EIA Negative     Influenza B Ag, EIA Negative    Blood Culture - Blood, Arm, Left [[id number]] Collected:  02/10/20 0018    Specimen:  Blood from Arm, Left Updated:  02/10/20 0022          CT Abdomen Pelvis With Contrast   Preliminary Result   IMPRESSION :    1. 4.7 cm suspected pericardial cyst, suggest echocardiography.   2. Minimal diverticulosis with short segment eccentric sigmoid colitis   as discussed. Recommend endoscopic follow-up                        Assessment/Plan      Active Hospital Problems    Diagnosis  POA   • Acute colitis [K52.9]  Yes      Resolved Hospital Problems   No resolved problems to display.     Acute colitis  -zosyn given in ED, will continue  -blood cultures pending  -IVF overnight  -clear liquids for now  -tylenol PRN fever  -pain medication PRN    VTE Ppx  -SCDs    CODE status  -full    I discussed the patients findings and my recommendations with patient and family.    KLEVER Mehta  [facility]  02/10/20  1:53 AM

## 2020-02-10 NOTE — ED PROVIDER NOTES
EMERGENCY DEPARTMENT ENCOUNTER    CHIEF COMPLAINT  Chief Complaint: abdominal pain  History given by: patient  History limited by: language barrier, patient's family translated for the patient  Room Number: P496/1  PMD: Provider, No Known      HPI:  Pt is a 39 y.o. male who presents complaining of lower abdominal pain that started one week ago. The patient also complains of a fever. The patient currently has a temperature of 102.5. The patient denies nausea, vomiting, dysuria, hematuria, malodorous urine, constipation, diarrhea, blood in stool, or rectal bleeding. The patient denies cough, ear pain, chest pain, or shortness of breath. The patient denies previous episodes of similar symptoms. The patient denies hx of nephrolithiasis in the past. The patient denies hx of abdominal surgeries in the past. The patient reports he has not had the flu shot this year. There are no other complaints at this time.    Duration: one week  Onset: gradual  Timing: constant  Location: lower abdomen  Radiation: none specified  Quality: pain  Intensity/Severity: moderate  Progression: unchanged  Associated Symptoms: fever (102.5 currently)  Aggravating Factors: none specified  Alleviating Factors: none specified  Previous Episodes: none specified  Treatment before arrival: none specified    PAST MEDICAL HISTORY  Active Ambulatory Problems     Diagnosis Date Noted   • No Active Ambulatory Problems     Resolved Ambulatory Problems     Diagnosis Date Noted   • No Resolved Ambulatory Problems     No Additional Past Medical History       PAST SURGICAL HISTORY  History reviewed. No pertinent surgical history.    FAMILY HISTORY  History reviewed. No pertinent family history.    SOCIAL HISTORY  Social History     Socioeconomic History   • Marital status:      Spouse name: Not on file   • Number of children: Not on file   • Years of education: Not on file   • Highest education level: Not on file   Tobacco Use   • Smoking status: Never  Smoker   Substance and Sexual Activity   • Alcohol use: Never     Frequency: Never   • Drug use: Never   • Sexual activity: Defer       ALLERGIES  Patient has no known allergies.    REVIEW OF SYSTEMS  Review of Systems   Constitutional: Positive for fever (tmax of 102.5). Negative for activity change and appetite change.   HENT: Negative for congestion, ear pain and sore throat.    Eyes: Negative.    Respiratory: Negative for cough and shortness of breath.    Cardiovascular: Negative for chest pain and leg swelling.   Gastrointestinal: Positive for abdominal pain (lower). Negative for anal bleeding, blood in stool, constipation, diarrhea, nausea and vomiting.   Endocrine: Negative.    Genitourinary: Negative for decreased urine volume, dysuria and hematuria.   Musculoskeletal: Negative for neck pain.   Skin: Negative for rash and wound.   Allergic/Immunologic: Negative.    Neurological: Negative for weakness, numbness and headaches.   Hematological: Negative.    Psychiatric/Behavioral: Negative.    All other systems reviewed and are negative.      PHYSICAL EXAM  ED Triage Vitals   Temp Heart Rate Resp BP SpO2   02/09/20 2116 02/09/20 2116 02/09/20 2116 02/09/20 2203 02/09/20 2116   (!) 101.4 °F (38.6 °C) 98 16 149/67 98 %      Temp src Heart Rate Source Patient Position BP Location FiO2 (%)   02/09/20 2116 02/09/20 2116 02/09/20 2203 02/09/20 2203 --   Tympanic Monitor Sitting Left arm        Physical Exam   Constitutional: He is oriented to person, place, and time. No distress.   HENT:   Head: Normocephalic and atraumatic.   Eyes: Pupils are equal, round, and reactive to light. EOM are normal.   Neck: Normal range of motion. Neck supple.   Cardiovascular: Regular rhythm, normal heart sounds and intact distal pulses. Tachycardia present. Exam reveals no gallop and no friction rub.   No murmur heard.  Pulmonary/Chest: Effort normal and breath sounds normal. No respiratory distress. He has no decreased breath sounds.  He has no wheezes. He has no rhonchi. He has no rales. He exhibits no tenderness.   Abdominal: Soft. Bowel sounds are normal. He exhibits no distension and no mass. There is tenderness (mild, lower). There is no rebound and no guarding.   Musculoskeletal: Normal range of motion. He exhibits no edema.   Extremities x 4 are unremarkable.   Neurological: He is alert and oriented to person, place, and time. He has normal sensation and normal strength.   He has a normal neuro exam.   Skin: Skin is warm and dry.   Psychiatric: Mood and affect normal.   Nursing note and vitals reviewed.      LAB RESULTS  Lab Results (last 24 hours)     Procedure Component Value Units Date/Time    CBC & Differential [[id number]] Collected:  02/09/20 2158    Specimen:  Blood Updated:  02/09/20 2228    Narrative:       The following orders were created for panel order CBC & Differential.  Procedure                               Abnormality         Status                     ---------                               -----------         ------                     CBC Auto Differential[[id number]]        Abnormal            Final result                 Please view results for these tests on the individual orders.    Comprehensive Metabolic Panel [[id number]]  (Abnormal) Collected:  02/09/20 2158    Specimen:  Blood Updated:  02/09/20 2252     Glucose 114 mg/dL      BUN 19 mg/dL      Creatinine 1.04 mg/dL      Sodium 138 mmol/L      Potassium 4.1 mmol/L      Chloride 99 mmol/L      CO2 27.5 mmol/L      Calcium 9.2 mg/dL      Total Protein 7.5 g/dL      Albumin 4.10 g/dL      ALT (SGPT) 39 U/L      AST (SGOT) 19 U/L      Alkaline Phosphatase 62 U/L      Total Bilirubin 0.7 mg/dL      eGFR Non African Amer 80 mL/min/1.73      eGFR  African Amer 96 mL/min/1.73      Globulin 3.4 gm/dL      A/G Ratio 1.2 g/dL      BUN/Creatinine Ratio 18.3     Anion Gap 11.5 mmol/L     Narrative:       GFR Normal >60  Chronic Kidney Disease <60  Kidney Failure <15       "Lipase [[id number]]  (Normal) Collected:  02/09/20 2158    Specimen:  Blood Updated:  02/09/20 2252     Lipase 29 U/L     CBC Auto Differential [[id number]]  (Abnormal) Collected:  02/09/20 2158    Specimen:  Blood Updated:  02/09/20 2228     WBC 11.71 10*3/mm3      RBC 4.75 10*6/mm3      Hemoglobin 14.6 g/dL      Hematocrit 43.7 %      MCV 92.0 fL      MCH 30.7 pg      MCHC 33.4 g/dL      RDW 11.9 %      RDW-SD 41.0 fl      MPV 9.3 fL      Platelets 214 10*3/mm3      Neutrophil % 73.2 %      Lymphocyte % 15.5 %      Monocyte % 9.6 %      Eosinophil % 1.1 %      Basophil % 0.3 %      Immature Grans % 0.3 %      Neutrophils, Absolute 8.56 10*3/mm3      Lymphocytes, Absolute 1.82 10*3/mm3      Monocytes, Absolute 1.13 10*3/mm3      Eosinophils, Absolute 0.13 10*3/mm3      Basophils, Absolute 0.03 10*3/mm3      Immature Grans, Absolute 0.04 10*3/mm3      nRBC 0.0 /100 WBC     Procalcitonin [[id number]]  (Normal) Collected:  02/09/20 2158    Specimen:  Blood Updated:  02/10/20 0336     Procalcitonin 0.10 ng/mL     Narrative:       As a Marker for Sepsis (Non-Neonates):   1. <0.5 ng/mL represents a low risk of severe sepsis and/or septic shock.  1. >2 ng/mL represents a high risk of severe sepsis and/or septic shock.    As a Marker for Lower Respiratory Tract Infections that require antibiotic therapy:  PCT on Admission     Antibiotic Therapy             6-12 Hrs later  > 0.5                Strongly Recommended            >0.25 - <0.5         Recommended  0.1 - 0.25           Discouraged                   Remeasure/reassess PCT  <0.1                 Strongly Discouraged          Remeasure/reassess PCT      As 28 day mortality risk marker: \"Change in Procalcitonin Result\" (> 80 % or <=80 %) if Day 0 (or Day 1) and Day 4 values are available. Refer to http://www.Dig[employer].mes-pct-calculator.com/   Change in PCT <=80 %   A decrease of PCT levels below or equal to 80 % defines a positive change in PCT test result representing a higher " risk for 28-day all-cause mortality of patients diagnosed with severe sepsis or septic shock.  Change in PCT > 80 %   A decrease of PCT levels of more than 80 % defines a negative change in PCT result representing a lower risk for 28-day all-cause mortality of patients diagnosed with severe sepsis or septic shock.                Results may be falsely decreased if patient taking Biotin.     Urinalysis With Microscopic If Indicated (No Culture) - Urine, Clean Catch [[id number]]  (Normal) Collected:  02/09/20 2244    Specimen:  Urine, Clean Catch Updated:  02/09/20 2308     Color, UA Yellow     Appearance, UA Clear     pH, UA 5.5     Specific Gravity, UA 1.024     Glucose, UA Negative     Ketones, UA Negative     Bilirubin, UA Negative     Blood, UA Negative     Protein, UA Negative     Leuk Esterase, UA Negative     Nitrite, UA Negative     Urobilinogen, UA 1.0 E.U./dL    Narrative:       Urine microscopic not indicated.    Blood Culture - Blood, Arm, Left [[id number]] Collected:  02/09/20 2358    Specimen:  Blood from Arm, Left Updated:  02/10/20 0005    Lactic Acid, Plasma [[id number]]  (Normal) Collected:  02/09/20 2358    Specimen:  Blood Updated:  02/10/20 0022     Lactate 1.1 mmol/L     Influenza Antigen, Rapid - Swab, Nasopharynx [[id number]]  (Normal) Collected:  02/10/20 0000    Specimen:  Swab from Nasopharynx Updated:  02/10/20 0024     Influenza A Ag, EIA Negative     Influenza B Ag, EIA Negative    Blood Culture - Blood, Arm, Left [[id number]] Collected:  02/10/20 0018    Specimen:  Blood from Arm, Left Updated:  02/10/20 0022    CBC (No Diff) [[id number]]  (Abnormal) Collected:  02/10/20 0657    Specimen:  Blood Updated:  02/10/20 0721     WBC 12.17 10*3/mm3      RBC 4.27 10*6/mm3      Hemoglobin 12.9 g/dL      Hematocrit 39.3 %      MCV 92.0 fL      MCH 30.2 pg      MCHC 32.8 g/dL      RDW 11.9 %      RDW-SD 39.9 fl      MPV 9.4 fL      Platelets 190 10*3/mm3     Basic Metabolic Panel [[id number]] Collected:   02/10/20 0658    Specimen:  Blood Updated:  02/10/20 0712          I ordered the above labs and reviewed the results    RADIOLOGY  CT Abdomen Pelvis With Contrast   Preliminary Result   IMPRESSION :    1. 4.7 cm suspected pericardial cyst, suggest echocardiography.   2. Minimal diverticulosis with short segment eccentric sigmoid colitis   as discussed. Recommend endoscopic follow-up                           I ordered the above noted radiological studies. Interpreted by radiologist. Reviewed by me in PACS.       PROCEDURES  Procedures      PROGRESS AND CONSULTS      2126 UA and blood work were ordered for further evaluation.    2238 Tylenol was ordered treat patient's fever.    2334 Ordered CT Abd/Pelvis, blood work, blood cultures, and Influenza Swab for further evaluation.    0125 Ordered Zosyn to treat colitis. Placed call to [facility] for admission.    0128 Rechecked the patient who is resting comfortably and in NAD. Patient is stable. BP- 149/67 HR- 98 Temp- (!) 102.5 °F (39.2 °C) (Tympanic) O2 sat- 98%. Informed the patient and his family of the CT Abd/Pelvis which shows sigmoid colitis. Due to the patient's sigmoid colitis and fever, discussed the plan for admission for further evaluation and management. Pt/family understands and agrees with the plan, all questions answered.    0143 Received a call from KLEVER Pope for Dr. Saad Flores ([facility]) and discussed pt's case. They agreed with plan to admit the patient to med/surg for further evaluation and management.      MEDICAL DECISION MAKING  Results were reviewed/discussed with the patient and they were also made aware of online access. Pt also made aware that some labs, such as cultures, will not be resulted during ER visit and follow up with PMD is necessary.     MDM  Number of Diagnoses or Management Options  Acute colitis:      Amount and/or Complexity of Data Reviewed  Clinical lab tests: ordered and reviewed (WBC: 11.71 and Lactic Acid: 1.1. Negative  Influenza Swab and UA.)  Tests in the radiology section of CPT®: ordered and reviewed (Ct Abdomen Pelvis With Contrast: 4.7 cm suspected pericardial cyst, suggest echocardiography. Minimal diverticulosis with short segment eccentric sigmoid colitis as discussed. Recommend endoscopic follow-up.)  Decide to obtain previous medical records or to obtain history from someone other than the patient: yes  Obtain history from someone other than the patient: yes (Patient's family interpreted for the patient.)  Review and summarize past medical records: yes (No previous visits in [facility].)  Discuss the patient with other providers: yes (KLEVER Pope for Dr. Saad Flores ([facility]))  Independent visualization of images, tracings, or specimens: yes    Patient Progress  Patient progress: stable         DIAGNOSIS  Final diagnoses:   Acute colitis       DISPOSITION  ADMISSION TO MED/SURG    Discussed treatment plan and reason for admission with pt/family and admitting physician.  Pt/family voiced understanding of the plan for admission for further testing/treatment as needed.    Latest Documented Vital Signs:  As of 7:30 AM  BP- 139/76 HR- 69 Temp- 99 °F (37.2 °C) (Oral) O2 sat- 97%    --  Documentation assistance provided by love Saldivar for Dr. Víctor Tello MD.  Information recorded by the scribe was done at my direction and has been verified and validated by me.     Ivania Saldivar  02/10/20 0151       Víctor Tello MD  02/10/20 1205

## 2020-02-10 NOTE — ED NOTES
Pt c/o generalized abd pain x1 week, no N/V,  States subjective fever last night. Also reports headache and decreased urine output.     Babatunde Oropeza, RN  02/09/20 8394

## 2020-02-11 VITALS
BODY MASS INDEX: 37.04 KG/M2 | HEIGHT: 71 IN | TEMPERATURE: 97.2 F | RESPIRATION RATE: 16 BRPM | DIASTOLIC BLOOD PRESSURE: 61 MMHG | WEIGHT: 264.55 LBS | OXYGEN SATURATION: 94 % | HEART RATE: 58 BPM | SYSTOLIC BLOOD PRESSURE: 127 MMHG

## 2020-02-11 PROCEDURE — G0008 ADMIN INFLUENZA VIRUS VAC: HCPCS | Performed by: HOSPITALIST

## 2020-02-11 PROCEDURE — 25010000002 PIPERACILLIN SOD-TAZOBACTAM PER 1 G: Performed by: NURSE PRACTITIONER

## 2020-02-11 PROCEDURE — 25010000002 INFLUENZA VAC SPLIT QUAD 0.5 ML SUSPENSION PREFILLED SYRINGE: Performed by: HOSPITALIST

## 2020-02-11 PROCEDURE — 99232 SBSQ HOSP IP/OBS MODERATE 35: CPT | Performed by: INTERNAL MEDICINE

## 2020-02-11 PROCEDURE — 90686 IIV4 VACC NO PRSV 0.5 ML IM: CPT | Performed by: HOSPITALIST

## 2020-02-11 RX ORDER — AMOXICILLIN AND CLAVULANATE POTASSIUM 875; 125 MG/1; MG/1
1 TABLET, FILM COATED ORAL 2 TIMES DAILY
Qty: 20 TABLET | Refills: 0 | Status: SHIPPED | OUTPATIENT
Start: 2020-02-11

## 2020-02-11 RX ADMIN — TAZOBACTAM SODIUM AND PIPERACILLIN SODIUM 3.38 G: 375; 3 INJECTION, SOLUTION INTRAVENOUS at 00:11

## 2020-02-11 RX ADMIN — TAZOBACTAM SODIUM AND PIPERACILLIN SODIUM 3.38 G: 375; 3 INJECTION, SOLUTION INTRAVENOUS at 10:52

## 2020-02-11 RX ADMIN — SODIUM CHLORIDE 100 ML/HR: 9 INJECTION, SOLUTION INTRAVENOUS at 06:07

## 2020-02-11 RX ADMIN — INFLUENZA A VIRUS A/BRISBANE/02/2018 IVR-190 (H1N1) ANTIGEN (PROPIOLACTONE INACTIVATED), INFLUENZA A VIRUS A/KANSAS/14/2017 X-327 (H3N2) ANTIGEN (PROPIOLACTONE INACTIVATED), INFLUENZA B VIRUS B/MARYLAND/15/2016 ANTIGEN (PROPIOLACTONE INACTIVATED), INFLUENZA B VIRUS B/PHUKET/3073/2013 BVR-1B ANTIGEN (PROPIOLACTONE INACTIVATED) 0.5 ML: 15; 15; 15; 15 INJECTION, SUSPENSION INTRAMUSCULAR at 18:09

## 2020-02-11 NOTE — PROGRESS NOTES
[facility]  Inpatient Progress Note    Reason for Follow Up: Colitis    Subjective     Interval History:   historyobtained from sisters at bedside he has minimal loose stools this morning none overnight, no rectal bleeding, minimal abdominal pain, he would like to be discharged and get back to work in the next 48 hours    Current Facility-Administered Medications:   •  acetaminophen (TYLENOL) tablet 650 mg, 650 mg, Oral, Q4H PRN, 650 mg at 02/10/20 2130 **OR** acetaminophen (TYLENOL) 160 MG/5ML solution 650 mg, 650 mg, Oral, Q4H PRN **OR** acetaminophen (TYLENOL) suppository 650 mg, 650 mg, Rectal, Q4H PRN, Ann Wilder APRN  •  influenza vac split quad (FLUZONE,FLUARIX,AFLURIA) injection 0.5 mL, 0.5 mL, Intramuscular, Once, Saad Flores MD  •  morphine injection 2 mg, 2 mg, Intravenous, Q2H PRN, Ann Wilder APRN, 2 mg at 02/10/20 1447  •  ondansetron (ZOFRAN) injection 4 mg, 4 mg, Intravenous, Q6H PRN, Ann Wilder APRN  •  piperacillin-tazobactam (ZOSYN) 3.375 g in iso-osmotic dextrose 50 ml (premix), 3.375 g, Intravenous, Q8H, Ann Wilder APRN, 3.375 g at 02/11/20 1052  •  sodium chloride 0.9 % flush 10 mL, 10 mL, Intravenous, PRN, Víctor Tello MD  •  sodium chloride 0.9 % flush 10 mL, 10 mL, Intravenous, Q12H, Ann Wilder APRDONI  •  sodium chloride 0.9 % flush 10 mL, 10 mL, Intravenous, PRN, Ann Wilder APRN  •  sodium chloride 0.9 % infusion, 100 mL/hr, Intravenous, Continuous, Ann Wilder APRN, Last Rate: 100 mL/hr at 02/11/20 0607, 100 mL/hr at 02/11/20 0607  Review of Systems:    He has a bit of bloating all other systems reviewed and negative    Objective     Vital Signs  Temp:  [97.2 °F (36.2 °C)-97.7 °F (36.5 °C)] 97.2 °F (36.2 °C)  Heart Rate:  [58-61] 58  Resp:  [16-20] 16  BP: (118-139)/(61-76) 127/61  Body mass index is 37.04 kg/m².    Intake/Output Summary (Last 24 hours) at 2/11/2020 1130  Last data filed at 2/11/2020  0606  Gross per 24 hour   Intake 3595 ml   Output --   Net 3595 ml     No intake/output data recorded.     Physical Exam:   General: patient awake, alert and cooperative   Eyes: Normal lids and lashes, no scleral icterus   Neck: supple, normal ROM   Skin: warm and dry, not jaundiced   Cardiovascular: regular rhythm and rate, no murmurs auscultated   Pulm: clear to auscultation bilaterally, regular and unlabored   Abdomen: soft, nontender, nondistended; normal bowel sounds   Extremities: no rash or edema   Psychiatric: Normal mood and behavior; memory intact     Results Review:     I reviewed the patient's new clinical results.    Results from last 7 days   Lab Units 02/10/20  0657 02/09/20  2158   WBC 10*3/mm3 12.17* 11.71*   HEMOGLOBIN g/dL 12.9* 14.6   HEMATOCRIT % 39.3 43.7   PLATELETS 10*3/mm3 190 214     Results from last 7 days   Lab Units 02/10/20  0658 02/09/20  2158   SODIUM mmol/L 139 138   POTASSIUM mmol/L 4.1 4.1   CHLORIDE mmol/L 102 99   CO2 mmol/L 25.2 27.5   BUN mg/dL 19 19   CREATININE mg/dL 0.97 1.04   CALCIUM mg/dL 9.1 9.2   BILIRUBIN mg/dL  --  0.7   ALK PHOS U/L  --  62   ALT (SGPT) U/L  --  39   AST (SGOT) U/L  --  19   GLUCOSE mg/dL 113* 114*         Lab Results   Lab Value Date/Time    LIPASE 29 02/09/2020 2158       Radiology:  CT Abdomen Pelvis With Contrast   Final Result   IMPRESSION :    1. 4.7 cm suspected pericardial cyst, suggest echocardiography.   2. Minimal diverticulosis with short segment eccentric sigmoid colitis   as discussed. Recommend endoscopic follow-up           This report was finalized on 2/10/2020 11:34 PM by Sánchez Minor M.D.              Assessment/Plan     Patient Active Problem List   Diagnosis   • Acute colitis   • Sepsis (CMS/HCC)   • Left lower quadrant abdominal pain       Assessment:  1. 39-year-old male admitted with pain diarrhea and what appears to be a colitis, feeling much better today      Plan:  · Discharge home  · Follow-up with Dr. Mccullough in  6 to 8  weeks, I will schedule  · A 10-day course of p.o. antibiotics would not be unreasonable  I discussed the patients findings and my recommendations with patient and nursing staff. And family    Pankaj Rouse MD

## 2020-02-11 NOTE — PROGRESS NOTES
Case Management Discharge Note      Final Note: Discharged to home on 2/11/2020. RANDI Ramos Rn, CCP.          Destination      No service has been selected for the patient.      Durable Medical Equipment      No service has been selected for the patient.      Dialysis/Infusion      No service has been selected for the patient.      Home Medical Care      No service has been selected for the patient.      Therapy      No service has been selected for the patient.      Community Resources      No service has been selected for the patient.             Final Discharge Disposition Code: 01 - home or self-care

## 2020-02-11 NOTE — DISCHARGE SUMMARY
Patient Name: Navdeep Mcelroy  : 1980  MRN: [id number]    Date of Admission: 2020  Date of Discharge:  2020  Primary Care Physician: Provider, No Known      Chief Complaint:   Abdominal Pain      Discharge Diagnoses     Active Hospital Problems    Diagnosis  POA   • **Acute colitis [K52.9]  Yes   • Sepsis (CMS/HCC) [A41.9]  Yes   • Left lower quadrant abdominal pain [R10.32]  Yes      Resolved Hospital Problems   No resolved problems to display.        Hospital Course     Mr. Mcelroy is a 39 y.o. male with no known medical history who presented to [facility] initially complaining of abdominal pain.  Please see the admitting history and physical for further details.  He was found to have colitis versus diverticulitis and was admitted to the hospital for further evaluation and treatment. The patient was started on IVFs, IV antibiotics, a clear liquid diet that was advanced as tolerated and pain/nausea control. Gastroenterology saw in consultation who recommends a colonoscopy in 6-8 weeks. GI PCR and C diff colitis stool studies negative. Blood cultures NGTD at time of discharge. Incidental finding on CT abdomen/pelivs showed a 4.7cm suspected pericardial cyst and an echocardiogram was ordered that showed normal LVF, EF 61%, negative saline study. He will be sent home on a 10 day course of Augmentin for colitis and will need to follow up with GI in 6-8 weeks. He will be discharged home today in stable condition and was instructed to remain on a full liquid diet, advance as tolerated to low fiber, low residue.       Day of Discharge     No new complaints or events overnight, patient is ready to go home. Still with some mild abdominal discomfort.    denies fever, chills, nausea and vomiting.    Physical Exam:  Temp:  [97.2 °F (36.2 °C)-97.7 °F (36.5 °C)] 97.2 °F (36.2 °C)  Heart Rate:  [58-61] 58  Resp:  [16-20] 16  BP: (118-139)/(61-76) 127/61  Body mass index is 37.04  kg/m².  Physical Exam   Constitutional: He is oriented to person, place, and time. He appears well-developed and well-nourished.   HENT:   Head: Normocephalic and atraumatic.   Eyes: Conjunctivae and EOM are normal.   Neck: Neck supple. No JVD present.   Cardiovascular: Normal rate and regular rhythm.   Pulmonary/Chest: Effort normal and breath sounds normal.   Abdominal: Soft. Bowel sounds are normal. He exhibits no distension. There is tenderness.   Musculoskeletal: Normal range of motion. He exhibits no edema.   Neurological: He is alert and oriented to person, place, and time.   Skin: Skin is warm and dry.   Psychiatric: He has a normal mood and affect. His behavior is normal.   Nursing note and vitals reviewed.      Consultants     Consult Orders (all) (From admission, onward)     Start     Ordered    02/10/20 0941  Inpatient Gastroenterology Consult  Once     Specialty:  Gastroenterology  Provider:  Ilana Rodriguez MD    02/10/20 0941              Procedures     Imaging Results (All)     Procedure Component Value Units Date/Time    CT Abdomen Pelvis With Contrast [[id number]] Collected:  02/10/20 0104     Updated:  02/10/20 2338    Narrative:       CT SCANS ABDOMEN AND PELVIS WITH IV CONTRAST.     HISTORY: low abdominal pain and fever     COMPARISON: None.     TECHNIQUE: Radiation dose reduction techniques were utilized, including  automated exposure control and exposure modulation based on body size.  Axial images were obtained from the lung bases to the symphysis pubis  with IV contrast only.. Oral contrast was not administered per request.     FINDINGS :  There is an ovoid 4.7 x 1.4 cm cystic lesion along the  pericardial surface to the right of midline likely pericardial cyst.  Suggest echocardiography for further evaluation and characterization.     Gallbladder contracted without calcified gallstones. Remaining solid  organs have an unremarkable appearance. Normal aorta and appendix.     There is a  short segment mid sigmoid colitis with asymmetric wall and  fold thickening, greater posteriorly with adjacent fluid and/or  phlegmonous material and inflammation consistent with a nonspecific  colitis. This is most likely infectious or inflammatory. A few  diverticula are present and could be indicative of acute diverticulitis.  (This could be related to epiploic appendagitis as well, image 141.)  Suggest endoscopic follow-up. The GI tract not opacified for assessment  but non obstructive in appearance.             Impression:       IMPRESSION :   1. 4.7 cm suspected pericardial cyst, suggest echocardiography.  2. Minimal diverticulosis with short segment eccentric sigmoid colitis  as discussed. Recommend endoscopic follow-up        This report was finalized on 2/10/2020 11:34 PM by Sánchez Minor M.D.             Pertinent Labs     Results from last 7 days   Lab Units 02/10/20  0657 02/09/20  2158   WBC 10*3/mm3 12.17* 11.71*   HEMOGLOBIN g/dL 12.9* 14.6   PLATELETS 10*3/mm3 190 214     Results from last 7 days   Lab Units 02/10/20  0658 02/09/20  2158   SODIUM mmol/L 139 138   POTASSIUM mmol/L 4.1 4.1   CHLORIDE mmol/L 102 99   CO2 mmol/L 25.2 27.5   BUN mg/dL 19 19   CREATININE mg/dL 0.97 1.04   GLUCOSE mg/dL 113* 114*   Estimated Creatinine Clearance: 134.5 mL/min (by C-G formula based on SCr of 0.97 mg/dL).  Results from last 7 days   Lab Units 02/09/20  2158   ALBUMIN g/dL 4.10   BILIRUBIN mg/dL 0.7   ALK PHOS U/L 62   AST (SGOT) U/L 19   ALT (SGPT) U/L 39     Results from last 7 days   Lab Units 02/10/20  0658 02/09/20  2158   CALCIUM mg/dL 9.1 9.2   ALBUMIN g/dL  --  4.10     Results from last 7 days   Lab Units 02/09/20  2158   LIPASE U/L 29             Invalid input(s): LDLCALC  Results from last 7 days   Lab Units 02/10/20  0018 02/09/20  2358   BLOODCX  No growth at 24 hours No growth at 24 hours       Test Results Pending at Discharge      Order Current Status    Blood Culture - Blood, Arm, Left  Preliminary result    Blood Culture - Blood, Arm, Left Preliminary result          Discharge Details        Discharge Medications      New Medications      Instructions Start Date   amoxicillin-clavulanate 875-125 MG per tablet  Commonly known as:  AUGMENTIN   1 tablet, Oral, 2 Times Daily             No Known Allergies      Discharge Disposition:  Home or Self Care    Discharge Diet:  Diet Order   Procedures   • Diet Clear Liquid       Discharge Activity:   Activity Instructions     Activity as Tolerated            CODE STATUS:    Code Status and Medical Interventions:   Ordered at: 02/10/20 0236     Code Status:    CPR     Medical Interventions (Level of Support Prior to Arrest):    Full       No future appointments.  Additional Instructions for the Follow-ups that You Need to Schedule     Discharge Follow-up with Specified Provider: Dr. Mccullough; 6 Weeks   As directed      To:  Dr. Mccullough    Follow Up:  6 Weeks           Follow-up Information     Provider, No Known .    Contact information:  [facility] [address] [phone]                   Additional Instructions for the Follow-ups that You Need to Schedule     Discharge Follow-up with Specified Provider: Dr. Mccullough; 6 Weeks   As directed      To:  Dr. Mccullough    Follow Up:  6 Weeks           Time Spent on Discharge:  Greater than 30 minutes      KLEVER Rowland  [facility]  02/11/20  10:18 AM

## 2020-02-11 NOTE — PROGRESS NOTES
Discharge Planning Assessment  [facility]     Patient Name: Navdeep Mcelroy  MRN: [id number]  Today's Date: 2/11/2020    Admit Date: 2/9/2020    Discharge Needs Assessment    No documentation.       Discharge Plan     Row Name 02/11/20 1731       Plan    Plan Comments  The patient has no insurance and states he can not afford the Augmentin. Called Funmilayo Turcios and she states okay for $30.00 the [employer] will cover. Meds to beds was done. RANDI Ramos Rn, CCP.     Final Discharge Disposition Code  01 - home or self-care    Row Name 02/11/20 1617       Plan    Final Discharge Disposition Code  01 - home or self-care    Row Name 02/11/20 1613       Plan    Final Note  Discharged to home on 2/11/2020. RANDI Ramos Rn, CCP.         Destination      Coordination has not been started for this encounter.      Durable Medical Equipment      Coordination has not been started for this encounter.      Dialysis/Infusion      Coordination has not been started for this encounter.      Home Medical Care      Coordination has not been started for this encounter.      Therapy      Coordination has not been started for this encounter.      Community Resources      Coordination has not been started for this encounter.        Expected Discharge Date and Time     Expected Discharge Date Expected Discharge Time    Feb 11, 2020         Demographic Summary    No documentation.       Functional Status    No documentation.       Psychosocial    No documentation.       Abuse/Neglect    No documentation.       Legal    No documentation.       Substance Abuse    No documentation.       Patient Forms    No documentation.           Christina Ramos RN

## 2020-02-11 NOTE — PLAN OF CARE
Problem: Patient Care Overview  Goal: Plan of Care Review  Outcome: Ongoing (interventions implemented as appropriate)  Flowsheets (Taken 2/11/2020 5315)  Progress: improving  Plan of Care Reviewed With: patient; family  Note:   Pt has been w/o c/o abd pain. Pt with c/o HA, tylenol admin and pt reports improved. IVF continuous. IV abx. Pt up ad zoë and agrees to notify staff of needs. Pt appears to have rested/slept comfortably through NOC. Spouse at bsd. Continue to monitor and tx per POC and MD orders.

## 2020-02-15 LAB
BACTERIA SPEC AEROBE CULT: NORMAL
BACTERIA SPEC AEROBE CULT: NORMAL